# Patient Record
Sex: MALE | Race: WHITE | URBAN - NONMETROPOLITAN AREA
[De-identification: names, ages, dates, MRNs, and addresses within clinical notes are randomized per-mention and may not be internally consistent; named-entity substitution may affect disease eponyms.]

---

## 2019-01-21 ENCOUNTER — OFFICE VISIT (OUTPATIENT)
Dept: UROLOGY | Facility: CLINIC | Age: 29
End: 2019-01-21

## 2019-01-21 VITALS — BODY MASS INDEX: 22.22 KG/M2 | HEIGHT: 69 IN | WEIGHT: 150 LBS

## 2019-01-21 DIAGNOSIS — Z98.52 VASECTOMY STATUS: Primary | ICD-10-CM

## 2019-01-21 PROCEDURE — 99203 OFFICE O/P NEW LOW 30 MIN: CPT | Performed by: UROLOGY

## 2019-01-21 RX ORDER — CEPHALEXIN 500 MG/1
500 CAPSULE ORAL 2 TIMES DAILY
Qty: 8 CAPSULE | Refills: 0 | Status: SHIPPED | OUTPATIENT
Start: 2019-01-21 | End: 2019-01-25

## 2019-01-21 RX ORDER — DIAZEPAM 10 MG/1
TABLET ORAL
Qty: 2 TABLET | Refills: 0 | Status: SHIPPED | OUTPATIENT
Start: 2019-01-21

## 2019-01-21 NOTE — PROGRESS NOTES
Chief Complaint:          Chief Complaint   Patient presents with   • Vasectomy consult       HPI:   29 y.o. male.  29-year-old white male accompanied by his wife with 5 children and is desirous of an elective vasectomy. Patient presents today for an elective scrotal vasectomy.  We discussed the anatomy of the vas deferens including its location from the testicle to the prostate and the fact that the procedure interupts the supply of sperm.  I indicated that it has a complication rate very rarely of bleeding and infection and a 1 in 10,000 risk of failure which is usually identified early in the course postoperatively.  We recommend checking semen analysis ×2 to be certain we are dealing with a sterile status.  We discussed single incision technique versus the no scalpel technique.  We discussed using medication pre-vasectomy in order to decrease anxiety as well as using antibiotics postoperatively as well.    Past Medical History:      History reviewed. No pertinent past medical history.      Current Meds:     No current outpatient medications on file.     No current facility-administered medications for this visit.         Allergies:      No Known Allergies     Past Surgical History:     History reviewed. No pertinent surgical history.      Social History:     Social History     Socioeconomic History   • Marital status: Unknown     Spouse name: Not on file   • Number of children: Not on file   • Years of education: Not on file   • Highest education level: Not on file   Social Needs   • Financial resource strain: Not on file   • Food insecurity - worry: Not on file   • Food insecurity - inability: Not on file   • Transportation needs - medical: Not on file   • Transportation needs - non-medical: Not on file   Occupational History   • Not on file   Tobacco Use   • Smoking status: Current Every Day Smoker     Packs/day: 2.00     Years: 16.00     Pack years: 32.00     Types: Cigarettes   • Smokeless tobacco: Never  Used   Substance and Sexual Activity   • Alcohol use: No     Frequency: Never   • Drug use: No   • Sexual activity: Yes     Partners: Female     Birth control/protection: None   Other Topics Concern   • Not on file   Social History Narrative   • Not on file       Family History:     Family History   Problem Relation Age of Onset   • Alzheimer's disease Maternal Grandfather        Review of Systems:     Review of Systems   Constitutional: Negative.    HENT: Negative.    Eyes: Negative.    Respiratory: Negative.    Cardiovascular: Negative.    Gastrointestinal: Negative.    Endocrine: Negative.    Musculoskeletal: Negative.    Allergic/Immunologic: Negative.    Neurological: Negative.    Hematological: Negative.    Psychiatric/Behavioral: Negative.        Physical Exam:     Physical Exam   Constitutional: He is oriented to person, place, and time. He appears well-developed and well-nourished.   HENT:   Head: Normocephalic and atraumatic.   Eyes: Conjunctivae and EOM are normal. Pupils are equal, round, and reactive to light.   Neck: Normal range of motion.   Cardiovascular: Normal rate, regular rhythm, normal heart sounds and intact distal pulses.   Pulmonary/Chest: Effort normal and breath sounds normal.   Abdominal: Soft. Bowel sounds are normal.   Genitourinary: Penis normal.   Musculoskeletal: Normal range of motion.   Neurological: He is alert and oriented to person, place, and time. He has normal reflexes.   Skin: Skin is warm and dry.   Psychiatric: He has a normal mood and affect. His behavior is normal. Judgment and thought content normal.   Nursing note and vitals reviewed.      I have reviewed the following portions of the patient's history: allergies, current medications, past family history, past medical history, past social history, past surgical history, problem list and ROS and confirm it's accurate.      Procedure:       Assessment/Plan:   Vasectomy status-Patient presents today for an elective  scrotal vasectomy.  We discussed the anatomy of the vas deferens including its location from the testicle to the prostate and the fact that the procedure interupts the supply of sperm.  I indicated that it has a complication rate very rarely of bleeding and infection and a 1 in 10,000 risk of failure which is usually identified early in the course postoperatively.  We recommend checking semen analysis ×2 to be certain we are dealing with a sterile status.  We discussed single incision technique versus the no scalpel technique.  We discussed using medication pre-vasectomy in order to decrease anxiety as well as using antibiotics postoperatively as well.     Patient's Body mass index is 21.89 kg/m². BMI is within normal parameters. No follow-up required..          This document has been electronically signed by KEITH GIORDANO MD January 21, 2019 10:31 AM

## 2019-03-04 ENCOUNTER — TELEPHONE (OUTPATIENT)
Dept: UROLOGY | Facility: CLINIC | Age: 29
End: 2019-03-04

## 2019-03-08 ENCOUNTER — PROCEDURE VISIT (OUTPATIENT)
Dept: UROLOGY | Facility: CLINIC | Age: 29
End: 2019-03-08

## 2019-03-08 VITALS — BODY MASS INDEX: 22.22 KG/M2 | HEIGHT: 69 IN | WEIGHT: 150 LBS

## 2019-03-08 DIAGNOSIS — Z98.52 VASECTOMY STATUS: Primary | ICD-10-CM

## 2019-03-08 PROCEDURE — 55250 REMOVAL OF SPERM DUCT(S): CPT | Performed by: UROLOGY

## 2019-03-08 RX ORDER — HYDROCODONE BITARTRATE AND ACETAMINOPHEN 5; 325 MG/1; MG/1
TABLET ORAL
Qty: 8 TABLET | Refills: 0 | Status: SHIPPED | OUTPATIENT
Start: 2019-03-08

## 2019-03-08 NOTE — PATIENT INSTRUCTIONS
Vasectomy, Care After  Refer to this sheet in the next few weeks. These instructions provide you with information on caring for yourself after your procedure. Your health care provider may also give you more specific instructions. Your treatment has been planned according to current medical practices, but problems sometimes occur. Call your health care provider if you have any problems or questions after your procedure.  What can I expect after the procedure?  After your procedure, it is typical to have the following:  · Slight swelling or redness or both at the surgical site.  · Mild pain or discomfort in the scrotum.  · Some oozing of blood from the cuts (incisions) made by the surgeon is normal during the first day or two after the procedure.  · Blood in the ejaculate is common and typically clears after a few days.    Follow these instructions at home:  · Only take over-the-counter or prescription medicines for pain, discomfort, or fever as directed by your health care provider.  · Avoid using nonsteroidal anti-inflammatory drugs (NSAIDs) because these can make bleeding worse.  · Apply ice to the injured area:  ? Put ice in a plastic bag.  ? Place a towel between your skin and the bag.  ? Leave the ice on for 20 minutes, 2-3 times a day.  · Avoid being active for the first 2 days after surgery.  · Wear a supporter while moving around for the first week after surgery. You may add some sterile fluffed bandages or a clean washcloth to the scrotal support if the scrotal support irritates your skin.  · Do not participate in sports or perform heavy physical labor for at least 2 weeks.  · You may have protected intercourse 7-10 days after your procedure. Remember, you are not sterile until follow-up specimens show no sperm in your ejaculate.  · Be sure to follow up with your surgeon as instructed to confirm sterility. It usually requires multiple ejaculations to clear the sperm located beyond the vasectomy site of  blockage. You will need at least two specimens showing an absence of sperm before you can resume unprotected intercourse.  Contact a health care provider if:  · You have redness, swelling, or increasing pain in the wounds or testicles (scrotum).  · You see pus coming from the wound.  · You have a fever.  · You notice a foul smell coming from the wound or dressing.  · You notice a breaking open of the stitches (suture) line or wound edges even after sutures have been removed.  · You have increased bleeding from the wounds.  Get help right away if:  · You develop a rash.  · You have difficulty breathing.  · You have any reaction or side effects to medicines given.  This information is not intended to replace advice given to you by your health care provider. Make sure you discuss any questions you have with your health care provider.  Document Released: 07/07/2006 Document Revised: 05/25/2017 Document Reviewed: 07/07/2014  [employer] Patient Education © 2018 [employer]

## 2019-03-08 NOTE — PROGRESS NOTES
Chief Complaint:          Chief Complaint   Patient presents with   • Sterilization       HPI:   29 y.o. male.  29-year-old white male accompanied by his wife with 5 children and is desirous of an elective vasectomy. Patient presents today for an elective scrotal vasectomy.  We discussed the anatomy of the vas deferens including its location from the testicle to the prostate and the fact that the procedure interupts the supply of sperm.  I indicated that it has a complication rate very rarely of bleeding and infection and a 1 in 10,000 risk of failure which is usually identified early in the course postoperatively.  We recommend checking semen analysis ×2 to be certain we are dealing with a sterile status.  We discussed single incision technique versus the no scalpel technique.  We discussed using medication pre-vasectomy in order to decrease anxiety as well as using antibiotics postoperatively as well.      Past Medical History:      History reviewed. No pertinent past medical history.      Current Meds:     Current Outpatient Medications   Medication Sig Dispense Refill   • diazePAM (VALIUM) 10 MG tablet Use one tablet night before procedure at bedtime and morning of prior to procedure 2 tablet 0     No current facility-administered medications for this visit.         Allergies:      No Known Allergies     Past Surgical History:     No past surgical history on file.      Social History:     Social History     Socioeconomic History   • Marital status: Unknown     Spouse name: Not on file   • Number of children: Not on file   • Years of education: Not on file   • Highest education level: Not on file   Social Needs   • Financial resource strain: Not on file   • Food insecurity - worry: Not on file   • Food insecurity - inability: Not on file   • Transportation needs - medical: Not on file   • Transportation needs - non-medical: Not on file   Occupational History   • Not on file   Tobacco Use   • Smoking status:  Current Every Day Smoker     Packs/day: 2.00     Years: 16.00     Pack years: 32.00     Types: Cigarettes   • Smokeless tobacco: Never Used   Substance and Sexual Activity   • Alcohol use: No     Frequency: Never   • Drug use: No   • Sexual activity: Yes     Partners: Female     Birth control/protection: None   Other Topics Concern   • Not on file   Social History Narrative   • Not on file       Family History:     Family History   Problem Relation Age of Onset   • Alzheimer's disease Maternal Grandfather        Review of Systems:     Review of Systems   Constitutional: Negative.    HENT: Negative.    Eyes: Negative.    Respiratory: Negative.    Cardiovascular: Negative.    Gastrointestinal: Negative.    Endocrine: Negative.    Musculoskeletal: Negative.    Allergic/Immunologic: Negative.    Neurological: Negative.    Hematological: Negative.    Psychiatric/Behavioral: Negative.        Physical Exam:     Physical Exam   Constitutional: He is oriented to person, place, and time. He appears well-developed and well-nourished.   HENT:   Head: Normocephalic and atraumatic.   Eyes: Conjunctivae and EOM are normal. Pupils are equal, round, and reactive to light.   Neck: Normal range of motion.   Cardiovascular: Normal rate, regular rhythm, normal heart sounds and intact distal pulses.   Pulmonary/Chest: Effort normal and breath sounds normal.   Abdominal: Soft. Bowel sounds are normal.   Genitourinary: Penis normal.   Musculoskeletal: Normal range of motion.   Neurological: He is alert and oriented to person, place, and time. He has normal reflexes.   Skin: Skin is warm and dry.   Psychiatric: He has a normal mood and affect. His behavior is normal. Judgment and thought content normal.   Nursing note and vitals reviewed.      I have reviewed the following portions of the patient's history: allergies, current medications, past family history, past medical history, past social history, past surgical history, problem list and  ROS and confirm it's accurate.      Procedure:   Elective scrotal vasectomy -After an appropriate informed consent including the risk of anesthesia, infection, scrotal hematoma, failure in the range of 1 in 10,000, chronic testalgia,  low testosterone, as well as the other very rare complications identified.  He was brought to the operative suite his scrotum was shaved and prepped in a sterile fashion using Betadine.  Local anesthetic was infiltrated into the midline scrotal raphae. A midline scrotal incision was made and the right vas entrapped.  I anesthetized the spermatic cord and skin using 5 cc of 1% Xylocaine with epinephrine after an adequate period of analgesia I  identified the vas and brought out into the incision.  The fascia was divided.  The vas was then doubly ligated fulgurated and the and was sewn in the sheath away from the proximal end.  Bovie electrocautery had been used to fulgurate the end of both vases as per the AUA guidelines Hemostasis was excellent and it was allowed to fall back in the scrotal sac the identical procedure was done on the contralateral side.  The skin was closed with a 3-0 chromic sutures.  Hemostasis was excellent he was recommended to use ice pack with a shield covering the scrotum to protect it from the ice.  He was given pain medication and antibiotics.  The incision was covered with bacitracin ointment.  The patient will be seen in 8 weeks whereupon we will then check a semen analysis to confirm the absence of spermatozoa and therby declare sterility.    Assessment/Plan:   Vasectomy status-await semen results  Narcotic pain medication-patient has significant acute pain that I believe would be an indication for the use of narcotic pain medication.  I discussed the significant risks of pain medication and the fact that this will be a short only option and I will give her no more than a three-day supply of pain medication.  And I will not plan long-term medication and  that this will be sent to a pain clinic if at all becomes necessary.  We discussed signing a pain medication agreement and the fact that we're going to run a state NATACHA review to be sure the patient is not getting pain medication from elsewhere.  If this is the case we will not give pain medication.  As part of the patient's treatment plan of there being prescribed a controlled substance with potential for abuse.  This patient has been wait aware of the appropriate dose of such medications including, the risk for somnolence, limited ability to drive and/or safety and the significant potential for overdose.  It is been made clear that these medications are for the prescribed patient only without concomitant use of alcohol or other sepsis unless prescribed by the medical provider.  Has completed prescribing agreement detailing the terms of continue prescribing him a controlled substance.  Including monitoring Natacha ports, the possibility of urine drug screens and pedal counts.  The patient is aware that we reviewed NATACHA reports on a regular basis and scan them into the chart.  History and physical examination exhibited continued safe and appropriate use of controlled substances.  Also discussed the fact that the new [place] legislation allows only a three-day prescription for pain medication.  In this situation he will be referred to a chronic pain clinic.    Patient's Body mass index is 21.89 kg/m². BMI is within normal parameters. No follow-up required..          This document has been electronically signed by KEITH GIORDANO MD March 8, 2019 8:11 AM

## 2019-03-26 ENCOUNTER — OFFICE VISIT (OUTPATIENT)
Dept: UROLOGY | Facility: CLINIC | Age: 29
End: 2019-03-26

## 2019-03-26 VITALS — BODY MASS INDEX: 22.22 KG/M2 | HEIGHT: 69 IN | WEIGHT: 150 LBS

## 2019-03-26 DIAGNOSIS — Z98.52 VASECTOMY STATUS: Primary | ICD-10-CM

## 2019-03-26 PROCEDURE — 99024 POSTOP FOLLOW-UP VISIT: CPT | Performed by: UROLOGY

## 2019-03-26 NOTE — PROGRESS NOTES
Chief Complaint:          Chief Complaint   Patient presents with   • Post Vasectomy problems       HPI:   29 y.o. male.  29-year-old white male status post an elective vasectomy with some irritation at the suture site.  It is well-healed the sutures are resolving I went ahead and remove the last few sutures per his request he tolerated it well he is good to give a semen analysis to be sure he is free of spermatozoa    Past Medical History:      History reviewed. No pertinent past medical history.      Current Meds:     Current Outpatient Medications   Medication Sig Dispense Refill   • diazePAM (VALIUM) 10 MG tablet Use one tablet night before procedure at bedtime and morning of prior to procedure 2 tablet 0   • HYDROcodone-acetaminophen (NORCO) 5-325 MG per tablet 1 to 2 Tablets Every 6 Hours as Needed for PAIN 8 tablet 0     No current facility-administered medications for this visit.         Allergies:      No Known Allergies     Past Surgical History:     History reviewed. No pertinent surgical history.      Social History:     Social History     Socioeconomic History   • Marital status: Unknown     Spouse name: Not on file   • Number of children: Not on file   • Years of education: Not on file   • Highest education level: Not on file   Tobacco Use   • Smoking status: Current Every Day Smoker     Packs/day: 2.00     Years: 16.00     Pack years: 32.00     Types: Cigarettes   • Smokeless tobacco: Never Used   Substance and Sexual Activity   • Alcohol use: No     Frequency: Never   • Drug use: No   • Sexual activity: Yes     Partners: Female     Birth control/protection: None       Family History:     Family History   Problem Relation Age of Onset   • Alzheimer's disease Maternal Grandfather        Review of Systems:     Review of Systems   Constitutional: Negative.    HENT: Negative.    Eyes: Negative.    Respiratory: Negative.    Cardiovascular: Negative.    Gastrointestinal: Negative.    Endocrine: Negative.     Musculoskeletal: Negative.    Allergic/Immunologic: Negative.    Neurological: Negative.    Hematological: Negative.    Psychiatric/Behavioral: Negative.        Physical Exam:     Physical Exam   Constitutional: He is oriented to person, place, and time. He appears well-developed and well-nourished.   HENT:   Head: Normocephalic and atraumatic.   Eyes: Conjunctivae and EOM are normal. Pupils are equal, round, and reactive to light.   Neck: Normal range of motion.   Cardiovascular: Normal rate, regular rhythm, normal heart sounds and intact distal pulses.   Pulmonary/Chest: Effort normal and breath sounds normal.   Abdominal: Soft. Bowel sounds are normal.   Genitourinary:   Genitourinary Comments: Healing scrotal incision no erythema, induration or tenderness   Musculoskeletal: Normal range of motion.   Neurological: He is alert and oriented to person, place, and time. He has normal reflexes.   Skin: Skin is warm and dry.   Psychiatric: He has a normal mood and affect. His behavior is normal. Judgment and thought content normal.   Nursing note and vitals reviewed.      I have reviewed the following portions of the patient's history: allergies, current medications, past family history, past medical history, past social history, past surgical history, problem list and ROS and confirm it's accurate.      Procedure:       Assessment/Plan:   Vasectomy status-doing well no evidence of infection will recommend semen analysis to reevaluate the presence of sperm postvasectomy    Patient's Body mass index is 22.15 kg/m². BMI is within normal parameters. No follow-up required..          This document has been electronically signed by KEITH GIORDANO MD March 26, 2019 12:52 PM